# Patient Record
Sex: FEMALE | URBAN - METROPOLITAN AREA
[De-identification: names, ages, dates, MRNs, and addresses within clinical notes are randomized per-mention and may not be internally consistent; named-entity substitution may affect disease eponyms.]

---

## 2021-09-29 LAB
AVERAGE GLUCOSE: NORMAL
AVERAGE GLUCOSE: NORMAL
CHOLESTEROL, TOTAL: 294 MG/DL
CHOLESTEROL/HDL RATIO: ABNORMAL
HBA1C MFR BLD: 8.7 %
HBA1C MFR BLD: 8.7 %
HDLC SERPL-MCNC: 35 MG/DL (ref 35–70)
LDL CHOLESTEROL CALCULATED: 184 MG/DL (ref 0–160)
NONHDLC SERPL-MCNC: ABNORMAL MG/DL
TRIGL SERPL-MCNC: 642 MG/DL
VLDLC SERPL CALC-MCNC: ABNORMAL MG/DL

## 2021-10-27 LAB — MICROALBUMIN UR-MCNC: NEGATIVE

## 2021-11-23 ENCOUNTER — CLINICAL DOCUMENTATION (OUTPATIENT)
Dept: PHARMACY | Facility: CLINIC | Age: 41
End: 2021-11-23

## 2021-11-23 NOTE — LETTER
Karen 2  [address], [place] [address]  Phone: toll free [phone] Option #3        [address] [address]           11/23/21     Dear Donna Murdock! You have successfully enrolled in the Be Well With Diabetes program for 2021. What you receive  Beginning December 1, you will begin receiving up to $300 in waived copays for specific medications and pharmacy-related supplies purchased through our home delivery pharmacy, [facility]. A list of eligible medications and pharmacy supplies can be found at [employer] under Be Well With Diabetes. In addition, youll receive advice and help from our pharmacists, associate care management team and diabetes educators. (And, if you also participate in the Be Well program, you can earn points and Lifestyle Management or Health Management program credit, if applicable.)    What you need to do  To maintain your benefit this year and remain eligible next year, complete the following requirements:          *Can be satisfied through [facility] Screening on-site. **Requirements to enroll must be completed within 6 months of enrollment date **Pneumonia vaccine is dependent on previous immunization and your age. Remember, program requirements must be completed by deadlines shown. If not, your benefit may be terminated, and you will not be eligible to participate again until the following year. To keep you on track, well review your [employer] account and send reminders for action. (If you dont have a [address], submit documentation to [email or web address] or by fax to [phone].)    Congratulations and thank you for taking steps to improve your health and to Be Well With Diabetes. [address] [place] [address]  Phone: [phone] Option #3  Email: [email or web address]  Fax: [phone]

## 2021-11-23 NOTE — PROGRESS NOTES
Pharmacy Pop Care Documentation:   Patient has completed all the requirements by 11/25/21 and therefore will be enrolled in the DM Program on 12/01/21. Application received: 35/41/23  Application scanned. Letter and e-mail sent to patient.     Gallito Hammer, [facility]   Department, toll free: [phone] Option #3

## 2021-11-23 NOTE — PROGRESS NOTES
Pharmacy Pop Care Documentation:     AVS received for required office visit on: 10/05/21: Katherine Zhu CNP     **DX per DM OV AVS: DM Type Two**

## 2021-12-01 ENCOUNTER — CLINICAL DOCUMENTATION (OUTPATIENT)
Dept: PHARMACY | Facility: CLINIC | Age: 41
End: 2021-12-01

## 2021-12-01 ENCOUNTER — ENROLLMENT (OUTPATIENT)
Dept: PHARMACY | Facility: CLINIC | Age: 41
End: 2021-12-01

## 2021-12-03 ENCOUNTER — TELEPHONE (OUTPATIENT)
Dept: PHARMACY | Facility: CLINIC | Age: 41
End: 2021-12-03

## 2021-12-03 NOTE — TELEPHONE ENCOUNTER
Called patient to schedule 2021 yearly pharmacist appointment to discuss medications for Diabetes Management Program.    No answer. Left VM. Please call back at [phone], option #3     No Mychart. Letter mailed to patient.     Arben Armendariz, [address]   Phone: [phone], option #3

## 2021-12-03 NOTE — LETTER
[address] [facility] 12  [address], [place] [address]        Denton FloresPili [place] [address] [address]         12/03/21   Dear Denton Draper,    Congratulations! You have completed the requirements to be enrolled into the [address] [place] [address] Be Well with Diabetes Program.    Now that you are enrolled, another yearly requirement is to complete a Clinical Pharmacist Telephone appointment. The [address] [place] [address] Team has attempted to contact you to schedule your 2021 Diabetes Management telephone appointment but was unable to reach you. We would like to work with you and your doctor to:  - Review your medications, including over-the-counter and herbal medications  - Answer questions about your medications and how to get the most benefit from them  - Identify potential drug interactions or side effects and help fix them  - Identify preferred medications that are equally effective, but available at a lower cost to you  - Help you reach the necessary requirements to remain enrolled in the Be Well with Diabetes Program offered by [address] [place] [address]     Please call [phone], option #3 to schedule this appointment to take advantage of this service. Telephone appointments are available Monday thru Friday from 7:30 AM till 5:30 PM.     This is a courtesy reminder. If you have this appointment already scheduled for your 2021 enrollment in the program, please disregard this message. If you have not scheduled this appointment yet, please contact us at the above number to schedule. Sincerely,   [address] [place] [address]  Phone: toll free [phone], option 3  Email: [email or web address]  Fax: [phone]

## 2021-12-06 NOTE — TELEPHONE ENCOUNTER
For [place] in place:  No   Recommendation Provided To: Patient/Caregiver: 1 via Telephone   Gap Closed?: No    Intervention Accepted By: Patient/Caregiver: 0   Time Spent (min): 15

## 2022-01-01 LAB
AVERAGE GLUCOSE: NORMAL
HBA1C MFR BLD: 11.4 %

## 2022-01-13 ENCOUNTER — TELEPHONE (OUTPATIENT)
Dept: PHARMACY | Facility: CLINIC | Age: 42
End: 2022-01-13

## 2022-01-13 NOTE — TELEPHONE ENCOUNTER
For [place] in place:  No   Recommendation Provided To: Patient/Caregiver: 1 via Telephone   Intervention Detail: Scheduled Appointment   Gap Closed?: No    Intervention Accepted By:   [facility] Time Spent (min): 10

## 2022-01-13 NOTE — TELEPHONE ENCOUNTER
According to our records, we have not received the following requirements that were due by December 31st, 2021: [facility] AT [place] clinical pharmacist telephone appointment (call [phone] and select option #3 to schedule this appointment, Monday thru Friday 7:30 AM - 5:30 PM). Left message on voicemail for patient to call us back at [phone] Option #3, regarding the above information. Diarize message not able to be sent.

## 2022-01-24 LAB
CREATININE, URINE: 19.4
CREATININE, URINE: 19.4
MICROALBUMIN/CREAT 24H UR: <4 MG/G{CREAT}
MICROALBUMIN/CREAT 24H UR: <4 MG/G{CREAT}
MICROALBUMIN/CREAT UR-RTO: NORMAL
MICROALBUMIN/CREAT UR-RTO: NORMAL

## 2022-02-17 ENCOUNTER — TELEPHONE (OUTPATIENT)
Dept: PHARMACY | Facility: CLINIC | Age: 42
End: 2022-02-17

## 2022-02-17 NOTE — TELEPHONE ENCOUNTER
2022 Annual Pharmacist Visit    Called patient to schedule 2022 yearly pharmacist appointment to discuss medications for Diabetes Management Program.    No answer. Left VM on 2/17/22: Please call back at [phone] Option #3.      Rizwana Velazquez, Via [employer] [facility]   Department, toll free: [phone] Option #3

## 2022-02-24 ENCOUNTER — TELEPHONE (OUTPATIENT)
Dept: PHARMACY | Facility: CLINIC | Age: 42
End: 2022-02-24

## 2022-02-24 NOTE — TELEPHONE ENCOUNTER
Second attempt made to contact patient to schedule 2022 yearly pharmacist appointment to discuss medications for Diabetes Management Program.    No answer. Left VM on 2/24/22: Please call back at [phone] Option #3. [employer] message sent to patient.     For [place] [facility] in place:  No   Recommendation Provided To: Patient/Caregiver: 1 via Telephone and [employer]   Gap Closed?: No    Intervention Accepted By: Patient/Caregiver: 0   Time Spent (min): [address], [address] [employer]   Department, toll free: [phone] Option #3

## 2022-02-24 NOTE — TELEPHONE ENCOUNTER
Patient called in to schedule pharmacist appointment for 2022 DM program.     Appt scheduled for 3/2/2022 @ [facility] in place:  No   Recommendation Provided To: Patient/Caregiver: 1 via Telephone   Intervention Detail: Scheduled Appointment   Gap Closed?: Yes    Intervention Accepted By: Patient/Caregiver: 1   Time Spent (min): 10

## 2022-03-02 ENCOUNTER — SCHEDULED TELEPHONE ENCOUNTER (OUTPATIENT)
Dept: PHARMACY | Facility: CLINIC | Age: 42
End: 2022-03-02

## 2022-03-02 RX ORDER — AMLODIPINE BESYLATE 10 MG/1
10 TABLET ORAL DAILY
COMMUNITY

## 2022-03-02 RX ORDER — PREDNISONE 20 MG/1
20 TABLET ORAL DAILY
COMMUNITY

## 2022-03-02 RX ORDER — BUPROPION HYDROCHLORIDE 150 MG/1
150 TABLET ORAL EVERY MORNING
COMMUNITY

## 2022-03-02 RX ORDER — ONDANSETRON 4 MG/1
4 TABLET, FILM COATED ORAL EVERY 8 HOURS PRN
COMMUNITY

## 2022-03-02 RX ORDER — PROMETHAZINE HYDROCHLORIDE 25 MG/1
25 TABLET ORAL EVERY 6 HOURS PRN
COMMUNITY

## 2022-03-02 RX ORDER — PIOGLITAZONEHYDROCHLORIDE 15 MG/1
15 TABLET ORAL DAILY
COMMUNITY

## 2022-03-02 RX ORDER — DICYCLOMINE HCL 20 MG
20 TABLET ORAL 2 TIMES DAILY
COMMUNITY

## 2022-03-02 RX ORDER — ASPIRIN 81 MG/1
81 TABLET ORAL DAILY
COMMUNITY

## 2022-03-02 RX ORDER — METOPROLOL SUCCINATE 25 MG/1
12.5 TABLET, EXTENDED RELEASE ORAL DAILY
COMMUNITY

## 2022-03-02 RX ORDER — TOCILIZUMAB 180 MG/ML
162 INJECTION, SOLUTION SUBCUTANEOUS
COMMUNITY

## 2022-03-02 RX ORDER — CHLORAL HYDRATE 500 MG
1000 CAPSULE ORAL DAILY
COMMUNITY

## 2022-03-02 RX ORDER — METFORMIN HYDROCHLORIDE 500 MG/1
1000 TABLET, EXTENDED RELEASE ORAL
COMMUNITY

## 2022-03-02 RX ORDER — AZATHIOPRINE 100 MG/1
100 TABLET ORAL DAILY
COMMUNITY

## 2022-03-02 RX ORDER — HYDROXYZINE HYDROCHLORIDE 25 MG/1
25 TABLET, FILM COATED ORAL 3 TIMES DAILY PRN
COMMUNITY

## 2022-03-02 RX ORDER — ATORVASTATIN CALCIUM 80 MG/1
80 TABLET, FILM COATED ORAL DAILY
COMMUNITY

## 2022-03-02 RX ORDER — INSULIN GLARGINE 100 [IU]/ML
28 INJECTION, SOLUTION SUBCUTANEOUS EVERY MORNING
COMMUNITY

## 2022-03-02 RX ORDER — SULFAMETHOXAZOLE AND TRIMETHOPRIM 800; 160 MG/1; MG/1
1 TABLET ORAL
COMMUNITY

## 2022-03-02 RX ORDER — DICLOFENAC SODIUM 75 MG/1
75 TABLET, DELAYED RELEASE ORAL DAILY
COMMUNITY

## 2022-03-02 RX ORDER — OMEPRAZOLE 40 MG/1
40 CAPSULE, DELAYED RELEASE ORAL DAILY
COMMUNITY

## 2022-03-02 RX ORDER — FLUCONAZOLE 100 MG/1
100 TABLET ORAL DAILY PRN
COMMUNITY

## 2022-03-02 RX ORDER — FLUOXETINE HYDROCHLORIDE 20 MG/1
20 CAPSULE ORAL DAILY
COMMUNITY

## 2022-03-02 NOTE — TELEPHONE ENCOUNTER
[facility] CLINICAL PHARMACY REVIEW - Be Well with Diabetes    Shanta Valle is a 46 y.o. female enrolled in the [facility] AT [place] Employee Diabetes Program. Patient provided Maldonado Pop with verbal consent to remain in the program for this year. Patient enrolled 12/1/21. Insurance through the following employer: [employer]    Medications:  No current outpatient medications on file. No current facility-administered medications for this visit. Current Pharmacy: [facility] and moving scripts over 1 by 1 when due for refills  Current testing supplies/frequency: Prodigy testing 3 times per day  Pen needles/syringes: discussed using leader brand, has script she can get transferred    Allergies:  Not on File   Vitals/Labs:  BP Readings from Last 3 Encounters:   No data found for BP     Lab Results   Component Value Date    LABMICR negative 10/27/2021     Lab Results   Component Value Date    LABA1C 8.7 09/29/2021     Lab Results   Component Value Date    CHOL 294 09/29/2021    TRIG 642 09/29/2021    HDL 35 09/29/2021    LDLCALC 184 (A) 09/29/2021     No results found for: ALT, AST  The ASCVD Risk score (Gisela Chapa., et al., 2013) failed to calculate for the following reasons: The 2013 ASCVD risk score is only valid for ages 36 to 78     No results found for: CREATININE  CrCl cannot be calculated (No successful lab value found. ). No results found for: LABGLOM    Immunizations: There is no immunization history on file for this patient. Social History:  Social History     Tobacco Use    Smoking status: Not on file    Smokeless tobacco: Not on file   Substance Use Topics    Alcohol use: Not on file     ASSESSMENT:  Initial Program Requirements (Y indicates has completed for the year, N indicates needs to be completed by 07/01/2022):   No - Provider Visit for DM (1st)- pcp is Karuna Sarabia CNP- will have to send in documentation  No - A1c (1st) Ongoing Program Requirements (Y indicates has completed for the year, N indicates needs to be completed by 12/31/2022): No - Provider Visit for DM (2nd)  No - ACC/diabetes educator visit (if A1c over 8%)  No - A1c (2nd)  No - Lipid panel  No - Urine microalbumin  No - Pneumococcal vaccination: [id number]  No - Influenza vaccination for Fall 2022- says her rheumatologist told her not to get, she did send exclusion to HR  No - Medication adherence over 70%  Yes - On statin - atorvastatin  No - On ACEi/ARB or contraindication(s) but on amlodipine and she has renal artery inflammation     Current medications eligible for copay waiver, up to $600, through [address]:  - aspirin, amlodipine, atorvastatin, lantus, metformin, pioglitazone  - Prodigy     Diabetes Care:   - Glycemic Goal: <7.0% and directed by provider. Is not at blood glucose goal but recent diagnosis.  Type 2 DM under inadequate control as evidenced by > 8.  -  Diagnosed Sept 2021, is also steroid induced and will be titrating down prednisone with hopes of being off prednisone all together   - Current symptoms/problems include blurry vision and polydipsia  - Home blood sugar records:  fasting range: 180-210 but was 200-300  - Any episodes of hypoglycemia? 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- this is why on xarelto  - Eye exam current (within one year): no  - Foot exam current (within one year): no  - Appropriateness of Insulin Therapy: managed by NP, on long acting  - Therapy Optimization: metformin IR was upsetting her stomach, XR has been better could consider increase in metformin, discussed combo with metformin and GLP1  - Medication compliance: compliant all of the time  - Diet compliance: compliant most of the time, trying to avoid carbs, trying to do 15 g per snack and 30-40 with  - Current exercise: has started 15-20 min per day, recumbent bike and walking    Other Considerations:  - Blood Pressure Goal: BP less than 140/90 mmHg due to history of DM: Unable to assess if at BP target. - Lipids: Patient has a 10-yr ASCVD risk of < 20% with DM and is therefore a candidate for moderate-intensity statin therapy based on updated guidelines. < 40 but TG and lipid panel uncontrolled and is on atorva 80 mg and fish oil  - Smoking status: current smoker but on wellbutrin to stop- down to 1/2 ppd    PLAN:  - Consideration(s) for provider:   · Has not had DM education and is a new diagnosis of DM.  Patient will ask her provider to order this  · Prednisone will be a long taper but hopefully off of them at some point  · Discussed breathe easy program  · Fasting still sound high but need to wait for next A1c  - DM program gaps identified:   · Initial requirements: Provider Visit for DM (1st) and A1c (1st)   · Ongoing requirements: Provider visit for DM (2nd), ACC/diabetes educator visit (if A1c over 8%), A1c (2nd), Lipid panel, Urine microalbumin, Pneumococcal vaccination: Antoinette Beltre Influenza vaccination for 0527-1762 and Medication adherence over 70%   - Education to patient: Discussed general issues about diabetes pathophysiology and management., Addressed diet and exercise and Reminded to get yearly retinal exam   - Follow up: PCP for identified gaps or as scheduled below  - Upcoming appointments:   Future Appointments   Date Time Provider Phong Jones   3/2/2022 11:00 AM SCHEDULE, [address] None       Joel Bah, PharmD, [address] Pharmacist  Department: [phone]    For Pharmacy Admin Tracking Only     Gap Closed?: Yes    Time Spent (min): 60

## 2022-03-20 LAB
AVERAGE GLUCOSE: NORMAL
HBA1C MFR BLD: 10.9 %

## 2022-03-22 ENCOUNTER — TELEPHONE (OUTPATIENT)
Dept: PHARMACY | Facility: CLINIC | Age: 42
End: 2022-03-22

## 2022-03-22 NOTE — TELEPHONE ENCOUNTER
[facility] CLINICAL PHARMACY REVIEW - BE WELL WITH DIABETES: HIGH A1C  =============================================  Meghan Cottrell is a 46 y.o. female enrolled in the [address] [place], [place] [address] Be Well with Diabetes program.    Identified care gap(s): A1c > 8%    DM Program Prescriptions:  Current Outpatient Medications   Medication Sig Dispense Refill    metFORMIN (GLUCOPHAGE-XR) 500 MG extended release tablet Take 1,000 mg by mouth daily (with breakfast)      pioglitazone (ACTOS) 15 MG tablet Take 15 mg by mouth daily      buPROPion (WELLBUTRIN XL) 150 MG extended release tablet Take 150 mg by mouth every morning      metoprolol succinate (TOPROL XL) 25 MG extended release tablet Take 12.5 mg by mouth daily      azaTHIOprine 100 MG TABS Take 100 mg by mouth daily       sulfamethoxazole-trimethoprim (BACTRIM DS;SEPTRA DS) 800-160 MG per tablet Take 1 tablet by mouth three times a week      predniSONE (DELTASONE) 20 MG tablet Take 20 mg by mouth daily      tocilizumab (ACTEMRA) 162 MG/0.9ML SOSY injection Inject 162 mg into the skin every 7 days      amLODIPine (NORVASC) 10 MG tablet Take 10 mg by mouth daily      aspirin 81 MG EC tablet Take 81 mg by mouth daily      atorvastatin (LIPITOR) 80 MG tablet Take 80 mg by mouth daily      diclofenac (VOLTAREN) 75 MG EC tablet Take 75 mg by mouth daily      dicyclomine (BENTYL) 20 MG tablet Take 20 mg by mouth in the morning and at bedtime      FLUoxetine (PROZAC) 20 MG capsule Take 20 mg by mouth daily      insulin glargine (LANTUS SOLOSTAR) 100 UNIT/ML injection pen Inject 28 Units into the skin every morning      omeprazole (PRILOSEC) 40 MG delayed release capsule Take 40 mg by mouth daily      rivaroxaban (XARELTO) 20 MG TABS tablet Take 20 mg by mouth daily (with breakfast)      ondansetron (ZOFRAN) 4 MG tablet Take 4 mg by mouth every 8 hours as needed for Nausea or Vomiting      promethazine (PHENERGAN) 25 MG tablet Take 25 mg by mouth every 6 hours as needed for Nausea      hydrOXYzine (ATARAX) 25 MG tablet Take 25 mg by mouth 3 times daily as needed for Anxiety      fluconazole (DIFLUCAN) 100 MG tablet Take 100 mg by mouth daily as needed (infection)      Omega-3 Fatty Acids (FISH OIL) 1000 MG CAPS Take 1,000 mg by mouth daily       No current facility-administered medications for this visit. Allergies: Allergies   Allergen Reactions    Zoloft [Sertraline] Swelling        Labs:  Lab Results   Component Value Date    LABA1C 8.7 09/29/2021     CrCl cannot be calculated (No successful lab value found. ). Care Team:   - Physician (PCP): Stefanie Mckoy CNP (Last OV: ?)  - ACC/RD: none  - Upcoming appointments:   No future appointments. Refill History:   Metformin 2/11/22  Pioglitazone 2/9/22  Prodigy 2/22/22    Diabetes Care:  - Glycemic Goal: <7.0% and directed by provider. Is not at blood glucose goal but is steroid induced. .   - Therapy Optimization:   - Medication changes since last A1c: tapering steroids? what dose of steroids is she on now? - Medication Adherence Assessment: just starting to fill at [facility]    Plan:    Attempt made to reach patient by telephone for diabetes review. Left voice message for patient to return clinician's phone call to dept. Will continue to attempt to contact patient by telephone as appropriate. Would like to review: see note     pcp is Stefanie Mckoy CNP- remind to send in documentation  When is new A1c? Did she get order placed for DM education? Still interested in breathe easy program  Prodigy filled for TID- how often testing sugars?  What is avg fasting     Nikko Oleary, PharmD, [address] Pharmacist  Department: [phone]    For Pharmacy Admin Tracking Only     Gap Closed?: No    Time Spent (min): 15

## 2022-04-21 ENCOUNTER — TELEPHONE (OUTPATIENT)
Dept: PHARMACY | Facility: CLINIC | Age: 42
End: 2022-04-21

## 2022-04-21 NOTE — TELEPHONE ENCOUNTER
POPULATION HEALTH CLINICAL PHARMACY REVIEW - BE WELL WITH DIABETES: HIGH A1C  =============================================  Dorota Charles is a 46 y.o. female enrolled in the [facility] AT [place] Be Well with Diabetes program.    Identified care gap(s): A1c > 8%    DM Program Prescriptions:  Current Outpatient Medications   Medication Sig Dispense Refill    metFORMIN (GLUCOPHAGE-XR) 500 MG extended release tablet Take 1,000 mg by mouth daily (with breakfast)      pioglitazone (ACTOS) 15 MG tablet Take 15 mg by mouth daily      buPROPion (WELLBUTRIN XL) 150 MG extended release tablet Take 150 mg by mouth every morning      metoprolol succinate (TOPROL XL) 25 MG extended release tablet Take 12.5 mg by mouth daily      azaTHIOprine 100 MG TABS Take 100 mg by mouth daily       sulfamethoxazole-trimethoprim (BACTRIM DS;SEPTRA DS) 800-160 MG per tablet Take 1 tablet by mouth three times a week      predniSONE (DELTASONE) 20 MG tablet Take 20 mg by mouth daily      tocilizumab (ACTEMRA) 162 MG/0.9ML SOSY injection Inject 162 mg into the skin every 7 days      amLODIPine (NORVASC) 10 MG tablet Take 10 mg by mouth daily      aspirin 81 MG EC tablet Take 81 mg by mouth daily      atorvastatin (LIPITOR) 80 MG tablet Take 80 mg by mouth daily      diclofenac (VOLTAREN) 75 MG EC tablet Take 75 mg by mouth daily      dicyclomine (BENTYL) 20 MG tablet Take 20 mg by mouth in the morning and at bedtime      FLUoxetine (PROZAC) 20 MG capsule Take 20 mg by mouth daily      insulin glargine (LANTUS SOLOSTAR) 100 UNIT/ML injection pen Inject 28 Units into the skin every morning      omeprazole (PRILOSEC) 40 MG delayed release capsule Take 40 mg by mouth daily      rivaroxaban (XARELTO) 20 MG TABS tablet Take 20 mg by mouth daily (with breakfast)      ondansetron (ZOFRAN) 4 MG tablet Take 4 mg by mouth every 8 hours as needed for Nausea or Vomiting      promethazine (PHENERGAN) 25 MG tablet Take 25 mg by mouth every 6 hours as needed for Nausea      hydrOXYzine (ATARAX) 25 MG tablet Take 25 mg by mouth 3 times daily as needed for Anxiety      fluconazole (DIFLUCAN) 100 MG tablet Take 100 mg by mouth daily as needed (infection)      Omega-3 Fatty Acids (FISH OIL) 1000 MG CAPS Take 1,000 mg by mouth daily       No current facility-administered medications for this visit. Allergies: Allergies   Allergen Reactions    Zoloft [Sertraline] Swelling        Labs:  Lab Results   Component Value Date    LABA1C 8.7 09/29/2021     CrCl cannot be calculated (No successful lab value found. ). Care Team:   - Physician (PCP): Nevaeh Bray CNP (Last OV: ?)    - Upcoming appointments:   No future appointments. Refill History:   Metformin 500 mg BID 2/11/22 for 90 ds  Pioglitazone 15 mg 2/9/22 90 ds  Lantus 10 units daily  Prodigy 2/22/22  Atorvastatin 80 mg 3/8/22    Diabetes Care:  - Glycemic Goal: <7.0% and directed by provider. Is not at blood glucose goal ...   - Appropriateness of Insulin Therapy: lantus just started  - Therapy Optimization: ozempic and lantus titration  - Medication changes since last A1c: pcp notes not able to be seen, however script for ozempic was filled on 3/24/22 and lantus on 3/28/22  - Medication Adherence Assessment: only one fills    Plan:    Attempt made to reach patient by telephone for diabetes review. Left voice message for patient to return clinician's phone call to dept. Will continue to attempt to contact patient by telephone as appropriate. Would like to review: see note     Patient on actemra and prednisone per rheumatology     pcp is Nevaeh Bray CNP- remind to send in documentation  When is new A1c? Did she get order placed for DM education? Still interested in breathe easy program?- per rheum note patient is on nictotine patch and wellbutrin and trying to quit- but I do not see these fills at [address] [facility] filled for TID- how often testing sugars?  What is avg fasting Millie Zayas, PharmD, [address] Pharmacist  Department: [phone]    For Pharmacy Admin Tracking Only     Gap Closed?: No    Time Spent (min): 15

## 2022-06-07 ENCOUNTER — PATIENT MESSAGE (OUTPATIENT)
Dept: PHARMACY | Facility: CLINIC | Age: 42
End: 2022-06-07

## 2022-06-07 ENCOUNTER — TELEPHONE (OUTPATIENT)
Dept: PHARMACY | Facility: CLINIC | Age: 42
End: 2022-06-07

## 2022-06-07 NOTE — TELEPHONE ENCOUNTER
[address] [facility] Employee Diabetes Program    Светлана Lauren is a 46 y.o. female enrolled in the [facility] Diabetes Program. The goal of this voluntary program is to help employees and covered dependents reach their health maintenance goals in regards to their diabetes diagnosis. According to our records, patient is missing the following requirement(s) that must be completed by July 1, 2022 to avoid discharge from the program:     First diabetes visit with your physician in 2022   First A1c result in 2022      Plan:  Attempt made to reach patient by telephone to review above. Left voice message for patient to return clinician's phone call to [phone], option 3. [employer] message sent to patient.     Sridevi Bradford, [address] [employer] [facility]   Department, toll free: [phone] Option #3

## 2022-06-07 NOTE — LETTER
Karen 2  [address] [facility], [address]  Phone: toll free [phone], option 3        Chica Brock [facility] [address]           06/16/22     Dear Tiffani Neff are currently enrolled in the Be Well with Diabetes Program. Our records indicate that we are missing the requirements listed below. If these requirements are not completed by July 1, 2022, then you may be disenrolled from the program:     - First diabetes visit with your physician in 2022   - First A1c result in 2022     You must submit documentation of completion of requirements if your provider does not use the [facility] electronic charting system or if completed outside of the system. Return the documentation to [email or web address] or by fax at [phone]. Please call our office so we can discuss the missing requirements with you to ensure that you will remain enrolled in the 2022 Be Well with Diabetes Program.     Thank you,     Karen 2 Team   Phone: toll free [phone], Option #3   Email: [email or web address]   Fax Number: [phone]

## 2022-06-22 LAB
AVERAGE GLUCOSE: NORMAL
CHOLESTEROL, TOTAL: 188 MG/DL
HBA1C MFR BLD: 5.9 %
TRIGL SERPL-MCNC: 311 MG/DL

## 2022-06-24 ENCOUNTER — CLINICAL DOCUMENTATION (OUTPATIENT)
Dept: PHARMACY | Facility: CLINIC | Age: 42
End: 2022-06-24

## 2022-06-24 NOTE — PROGRESS NOTES
Pharmacy Pop Care Documentation:     AVS received for required office visit on: 6/23/22: Iain Islas CNP

## 2022-09-21 LAB
CHOLESTEROL, TOTAL: 181 MG/DL
CHOLESTEROL/HDL RATIO: ABNORMAL
HDLC SERPL-MCNC: 28 MG/DL (ref 35–70)
LDL CHOLESTEROL CALCULATED: 80 MG/DL (ref 0–160)
NONHDLC SERPL-MCNC: ABNORMAL MG/DL
TRIGL SERPL-MCNC: 365 MG/DL
VLDLC SERPL CALC-MCNC: ABNORMAL MG/DL

## 2022-11-21 ENCOUNTER — CLINICAL DOCUMENTATION (OUTPATIENT)
Dept: PHARMACY | Facility: CLINIC | Age: 42
End: 2022-11-21

## 2022-11-21 NOTE — PROGRESS NOTES
Pharmacy Pop Care Documentation:     AVS received for required office visit on:  9/21/22 with Evans Florentino CNP- Careeverywhere    Lipid panel also completed and added from 9/21    W.  Nicki Bertrand, PharmD, [address] [facility], toll free: [phone]    For Pharmacy Admin Tracking Only  Time Spent (min): 5

## 2022-12-18 LAB
AVERAGE GLUCOSE: NORMAL
HBA1C MFR BLD: 6.4 %

## 2022-12-30 ENCOUNTER — CLINICAL DOCUMENTATION (OUTPATIENT)
Dept: PHARMACY | Facility: CLINIC | Age: 42
End: 2022-12-30

## 2023-01-04 ENCOUNTER — TELEPHONE (OUTPATIENT)
Dept: PHARMACY | Age: 43
End: 2023-01-04

## 2023-01-04 NOTE — TELEPHONE ENCOUNTER
Specialty Medication Service    Date: 1/4/2023  Patient's Name: Joseph Kerr YOB: 1980            _____________________________________________________________________________________________    Left message to schedule Medical Director  appointment for Specialty Medication Services. Please call: [phone] option 4. Will continue to outreach as appropriate.     Suma Ramos PharmD  Ambulatory Clinical Pharmacist   Specialty Medication Services   Phone: [phone] option 4  1/4/2023 11:27 AM

## 2023-01-11 NOTE — TELEPHONE ENCOUNTER
Specialty Medication Service    Date: 1/11/2023  Patient's Name: Yemi Mensah YOB: 1980            _____________________________________________________________________________________________    Left message to schedule Medical Director  appointment for Specialty Medication Services. Please call: [phone] option 4. Will continue to outreach as appropriate.     Bryan De Souza PharmD  Ambulatory Clinical Pharmacist   Specialty Medication Services   Phone: [phone] option 4  1/11/2023 10:58 AM

## 2023-01-27 NOTE — TELEPHONE ENCOUNTER
Specialty Medication Service    Date: 1/27/2023  Patient's Name: Shelley Iqbal YOB: 1980            _____________________________________________________________________________________________    Patient is not eligible for SMS program due to [employer] benefits. No further outreach planned at this time.     Jt Davenport PharmD, [facility]  Ambulatory Clinical Pharmacist   [address] [facility] Specialty Medication Service  Phone: [phone]  [facility]  Phone: [phone] option 1    For Pharmacy Admin Tracking Only    Program: SMS  CPA in place:  No  Time Spent (min): 20

## 2023-02-10 ENCOUNTER — TELEPHONE (OUTPATIENT)
Dept: PHARMACY | Facility: CLINIC | Age: 43
End: 2023-02-10

## 2023-02-10 NOTE — TELEPHONE ENCOUNTER
2023 Annual Pharmacist Visit  **Patient is [employer]**    Called patient to schedule 2023 yearly pharmacist appointment to discuss medications for Diabetes Management Program.    No answer. Left VM on TAD: Please call back at [phone] Option #3.      [address] [facility]   Department, toll free: [phone] Option #3

## 2023-02-24 NOTE — TELEPHONE ENCOUNTER
Second attempt made to contact patient to schedule 2023 yearly pharmacist appointment to discuss medications for Diabetes Management Program.    No answer. Left VM on TAD: Please call back at [phone] Option #3. Formabilio message sent to patient.     [address] [facility]   Department, toll free: [phone] Option #3        For Pharmacy Admin Tracking Only    Program: [address] in place:  No  Gap Closed?: No   Time Spent (min): 15

## 2023-03-07 ENCOUNTER — TELEPHONE (OUTPATIENT)
Dept: PHARMACY | Facility: CLINIC | Age: 43
End: 2023-03-07

## 2023-03-07 NOTE — TELEPHONE ENCOUNTER
**Patient is [employer]**     2023 Annual Pharmacist Visit    Called patient to schedule 2023 yearly pharmacist appointment to discuss medications for Diabetes Management Program.    No answer. Left VM.      Please call back at [phone], option #3         Isai Anderson, [address]   Phone: [phone], option #3

## 2023-03-14 NOTE — TELEPHONE ENCOUNTER
Second attempt made to contact patient to schedule 2023 yearly pharmacist appointment to discuss medications for Diabetes Management Program.    No answer. Left VM.     Please call back at [phone], option #3.     Gamart message sent.        Estephania Dela Cruz [facility]  Clinical Pharmacy   Phone: [phone], option #3     For Pharmacy Admin Tracking Only    Program: [employer]  CPA in place:  No  Gap Closed?: No   Time Spent (min): 10

## 2023-05-02 ENCOUNTER — TELEPHONE (OUTPATIENT)
Dept: PHARMACY | Facility: CLINIC | Age: 43
End: 2023-05-02

## 2023-05-15 NOTE — TELEPHONE ENCOUNTER
Second attempt made to contact patient to schedule 2023 yearly pharmacist appointment to discuss medications for Diabetes Management Program.    No answer. VM full. Please call back at [phone], option #3. [employer] message sent.         Honey Koyanagi, [address]   Phone: [phone], option #3       For Pharmacy Admin Tracking Only    Program: [address] in place:  No  Gap Closed?: Yes   Time Spent (min): 10

## 2023-05-16 LAB
AVERAGE GLUCOSE: NORMAL
CHOLESTEROL, TOTAL: 193 MG/DL
CHOLESTEROL/HDL RATIO: ABNORMAL
CREATININE, URINE: 105.6
HBA1C MFR BLD: 7.3 %
HDLC SERPL-MCNC: 31 MG/DL (ref 35–70)
LDL CHOLESTEROL CALCULATED: 90 MG/DL (ref 0–160)
MICROALBUMIN/CREAT 24H UR: NORMAL MG/G{CREAT}
MICROALBUMIN/CREAT UR-RTO: <20
NONHDLC SERPL-MCNC: ABNORMAL MG/DL
PROTEIN TOTAL, URINE: <3
TRIGL SERPL-MCNC: 359 MG/DL
VLDLC SERPL CALC-MCNC: ABNORMAL MG/DL

## 2023-05-31 ENCOUNTER — TELEPHONE (OUTPATIENT)
Dept: PHARMACY | Facility: CLINIC | Age: 43
End: 2023-05-31

## 2023-05-31 ENCOUNTER — CLINICAL DOCUMENTATION (OUTPATIENT)
Dept: PHARMACY | Facility: CLINIC | Age: 43
End: 2023-05-31

## 2023-05-31 RX ORDER — ESTRADIOL 1 MG/1
1 TABLET ORAL DAILY
COMMUNITY

## 2023-05-31 RX ORDER — MEDROXYPROGESTERONE ACETATE 2.5 MG/1
5 TABLET ORAL DAILY
COMMUNITY

## 2023-05-31 RX ORDER — ROSUVASTATIN CALCIUM 20 MG/1
20 TABLET, COATED ORAL DAILY
COMMUNITY

## 2023-05-31 RX ORDER — SEMAGLUTIDE 2.68 MG/ML
2 INJECTION, SOLUTION SUBCUTANEOUS WEEKLY
COMMUNITY

## 2023-05-31 RX ORDER — CLOPIDOGREL BISULFATE 75 MG/1
75 TABLET ORAL DAILY
COMMUNITY

## 2023-05-31 RX ORDER — CARVEDILOL 3.12 MG/1
3.12 TABLET ORAL 2 TIMES DAILY WITH MEALS
COMMUNITY

## 2023-05-31 RX ORDER — PANTOPRAZOLE SODIUM 40 MG/1
40 TABLET, DELAYED RELEASE ORAL DAILY
COMMUNITY

## 2023-05-31 NOTE — TELEPHONE ENCOUNTER
[facility] HEALTH CLINICAL PHARMACY REVIEW - Be Well with Diabetes    Petrona Lauren is a 38 y.o. female enrolled in the [address] [place] [address],[address] Employee Diabetes Program. Patient provided Verlie Sleek with verbal consent to remain in the program for this year. Patient enrolled 2021.     Insurance through the following employer: [employer]    Medications:  Current Outpatient Medications   Medication Instructions    Actemra 162 mg, SubCUTAneous, EVERY 7 DAYS    amLODIPine (NORVASC) 10 mg, Oral, DAILY    aspirin 81 mg, Oral, DAILY  Also now on plavix 75 mg daily    atorvastatin (LIPITOR) 80 mg, Oral, DAILY  - rosuvastatin 20 mg daily    azaTHIOprine 100 mg, Oral, DAILY  - 150 mg daily    buPROPion (WELLBUTRIN XL) 150 mg, Oral, EVERY MORNING  - no longer taking    diclofenac (VOLTAREN) 75 mg, Oral, DAILY    dicyclomine (BENTYL) 20 mg, Oral, 2 times daily    fish oil 1,000 mg, Oral, DAILY    fluconazole (DIFLUCAN) 100 mg, Oral, DAILY PRN    FLUoxetine (PROZAC) 20 mg, Oral, DAILY  - 40 mg daily    hydrOXYzine HCl (ATARAX) 25 mg, Oral, 3 TIMES DAILY PRN    Lantus SoloStar 28 Units, SubCUTAneous, EVERY MORNING  - 36 units am    metFORMIN (GLUCOPHAGE-XR) 1,000 mg, Oral, DAILY WITH BREAKFAST    metoprolol succinate (TOPROL XL) 12.5 mg, Oral, DAILY  - coreg    omeprazole (PRILOSEC) 40 mg, Oral, DAILY  - changed to protonix bc now on plavix    ondansetron (ZOFRAN) 4 mg, Oral, EVERY 8 HOURS PRN    pioglitazone (ACTOS) 15 mg, Oral, DAILY    predniSONE (DELTASONE) 20 mg, Oral, DAILY  - steroids have been stopped    promethazine (PHENERGAN) 25 mg, Oral, EVERY 6 HOURS PRN    rivaroxaban (XARELTO) 20 mg, Oral, DAILY WITH BREAKFAST  - stopped by vascular     sulfamethoxazole-trimethoprim (BACTRIM DS;SEPTRA DS) 800-160 MG per tablet 1 tablet, Oral, THREE TIMES WEEKLY  - no longer on chemo, so no longer on this     Current Pharmacy: [facility]  Current testing supplies/frequency: Prodigy and Dexcom

## 2023-05-31 NOTE — PROGRESS NOTES
Pharmacy Pop Care Documentation:     AVS received for required office visit on:  1st 2023 DM OV: 5/17/23 Dr. Kamar Cha per care everywhere      Hemoglobin A1c percentage  on 05-   HbA1c (Bld) [Mass fraction] 7.3 %      Urine albumin/creatinine mass ratio for detection of microalbuminuria  on 05-   Albumin/Creatinine DL <= 20 mg/L (U)          Urine creatinine measurement (mass/volume)  on 05-   Creatinine (U) [Mass/Vol] 105.60 mg/dL      Urine protein measurement (mass/volume)  on 05-   Protein (U) [Mass/Vol] <3.0 mg/L      Serum or plasma triglyceride measurement (mass/volume)  on 05-   Triglyceride [Mass/Vol] 359 mg/dL      Serum or plasma high density lipoprotein (HDL) cholesterol measurement  on 05-   Cholesterol in HDL [Mass/Vol] 31 mg/dL       Serum or plasma cholesterol measurement (mass/volume)  on 05-   Cholesterol [Mass/Vol] 193 mg/dL       Cholesterol in LDL Calc [Mass/Vol]  on 05-   Cholesterol in LDL [Mass/Vol] 90 mg/dL

## 2023-08-12 LAB
AVERAGE GLUCOSE: NORMAL
HBA1C MFR BLD: 8.3 %

## 2023-08-28 ENCOUNTER — CLINICAL DOCUMENTATION (OUTPATIENT)
Dept: PHARMACY | Facility: CLINIC | Age: 43
End: 2023-08-28

## 2023-08-28 NOTE — PROGRESS NOTES
Pharmacy Pop Care Documentation:     AVS received for required office visit on:  8/12/23 with Aaliyah Collado per careeverywhere    A1c updated    W.  Shanel Perez, PharmD,  [facility], toll free: [phone]    For Pharmacy Admin Tracking Only    Program: [address] [place] [address]  Time Spent (min): 5

## 2023-10-02 ENCOUNTER — TELEPHONE (OUTPATIENT)
Dept: PHARMACY | Facility: CLINIC | Age: 43
End: 2023-10-02

## 2023-10-02 NOTE — TELEPHONE ENCOUNTER
POPULATION HEALTH CLINICAL PHARMACY REVIEW - BE WELL WITH DIABETES: HIGH A1C  =============================================  Anatoliy Hart is a 38 y.o. female enrolled in the [facility] Be Well with Diabetes program.    Identified care gap(s): A1c > 8% (only requirement left in 2023)    DM Program Prescriptions:  Current Outpatient Medications   Medication Instructions    Actemra 162 mg, SubCUTAneous, EVERY 7 DAYS    amLODIPine (NORVASC) 10 mg, Oral, DAILY    aspirin 81 mg, Oral, DAILY    azaTHIOprine (IMURAN) 150 mg, Oral, DAILY    carvedilol (COREG) 3.125 mg, Oral, 2 TIMES DAILY WITH MEALS    clopidogrel (PLAVIX) 75 mg, Oral, DAILY    diclofenac (VOLTAREN) 75 mg, Oral, DAILY    Eluxadoline 100 mg, Oral, 2 times daily    estradiol (ESTRACE) 1 mg, Oral, DAILY    fish oil 1,000 mg, Oral, DAILY    FLUoxetine (PROZAC) 40 mg, Oral, DAILY    Lantus SoloStar 36 Units, SubCUTAneous, EVERY MORNING    medroxyPROGESTERone (PROVERA) 5 mg, Oral, DAILY    metFORMIN (GLUCOPHAGE-XR) 1,000 mg, Oral, DAILY WITH BREAKFAST    ondansetron (ZOFRAN) 4 mg, Oral, EVERY 8 HOURS PRN    Ozempic (2 MG/DOSE) 2 mg, SubCUTAneous, WEEKLY    pantoprazole (PROTONIX) 40 mg, Oral, DAILY    pioglitazone (ACTOS) 15 mg, Oral, DAILY    Probiotic Product (PROBIOTIC PO) 1 capsule, Oral, DAILY    rosuvastatin (CRESTOR) 20 mg, Oral, DAILY        Allergies: Allergies   Allergen Reactions    Zoloft [Sertraline] Swelling        Labs:  Lab Results   Component Value Date    LABA1C 8.3 08/12/2023    LABA1C 7.3 05/16/2023    LABA1C 6.4 12/18/2022     CrCl cannot be calculated (No successful lab value found. ). Care Team:   Physician (PCP): Aaliyah Collado (Last OV: 8/12/23- found in [address])    - Upcoming appointments:   No future appointments. Diabetes Care:  - Glycemic Goal: <7.0%. Is no longer at blood glucose goal and is most likely due to stopping Ozempic earlier this year.   She reports that she could not tolerate the GI side effects

## 2023-11-02 ENCOUNTER — TELEPHONE (OUTPATIENT)
Dept: PHARMACY | Facility: CLINIC | Age: 43
End: 2023-11-02

## 2023-11-02 NOTE — TELEPHONE ENCOUNTER
months ago. She has continued her other meds (metformin, Pioglitazone, Lantus) and is filling regularly. - Unclear in her provider note if she is now using anything different. Victoza was mentioned, but does not look to have been sent to [facility] or filled anywhere  - Pt had also been using Dexcom G6. Will make aware of likely cost difference next year, and that she can change to the [address] if interested     Plan:  Attempted to reach pt, LVM asking for return call to [phone] option 3     Would like to discuss:  - A1c was up in August.  Did she have any changes at her last OV?  - Did her provider prescribe Victoza? - Make aware that Dexcom price should likely improve next year. Could also consider SHIRIN Bueno, PharmD,  [facility]  Department, toll free: [phone]    For Pharmacy Admin Tracking Only    Program: [address] [place] [address]  Time Spent (min): 15

## 2024-01-26 ENCOUNTER — TELEPHONE (OUTPATIENT)
Dept: PHARMACY | Facility: CLINIC | Age: 44
End: 2024-01-26

## 2024-02-21 ENCOUNTER — TELEPHONE (OUTPATIENT)
Dept: PHARMACY | Facility: CLINIC | Age: 44
End: 2024-02-21

## 2024-02-21 RX ORDER — DULAGLUTIDE 0.75 MG/.5ML
0.75 INJECTION, SOLUTION SUBCUTANEOUS WEEKLY
COMMUNITY

## 2024-02-21 RX ORDER — CHOLECALCIFEROL (VITAMIN D3) 1250 MCG
50000 CAPSULE ORAL WEEKLY
COMMUNITY

## 2024-02-21 RX ORDER — LANCETS 28 GAUGE
EACH MISCELLANEOUS
COMMUNITY

## 2024-02-21 NOTE — TELEPHONE ENCOUNTER
Medication adherence over 70% - monitor adherence; tries to take DM medications in morning  Yes - On statin or contraindication(s)  rosuvastatin on med list - overdue to be refilled?  She believes she still has some at home but will double check; confirms taking daily  Override  - On ACEi/ARB or contraindication(s) Normal blood pressure, urinary albumin-to-creatinine ratio, and eGFR     Formulary Medication Review:  Non-formulary or medications with cost-effective alternatives: approved for Dexcom G7 per [place]; she will think about this    Current medications eligible for copay waiver, up to $600, through [facility]:  - amlodipine, aspirin (with prescription), Lantus, metformin, pioglitazone, rosuvastatin, Trulicity  - Prodigy blood sugar testing supplies, insulin pen needles  - Dexcom G7 if desired & prescribed by provider     Diabetes Care:   - Glycemic Goal: <7.0% and directed by provider. Is not at blood glucose goal. Type 2 DM on current prescribed regimen metformin ER 1000 mg (500 mg x 2) daily, pioglitazone 15 mg daily, Trulicity 0.75 mg once weekly, Lantus 36 units daily  - Home blood sugar records:  Checking AMFBG, sometimes another time throughout day; usually 140-160 (previously had been 160s or above so is noticing going down)  - Any episodes of hypoglycemia? no  - Eye exam current (within one year): unknown - she will follow up  - Foot exam current (within one year): yes  - Medication changes since last A1c: started Trulicity (had to stop Ozempic due to GI ADRs)  - Medications/Classes already tried/failed: Ozempic (GI side effects)  - Therapy Optimization: Trulicity can be titrated up to 4.5 mg once weekly as needed/tolerated  - Medication compliance: compliant all of the time    Other Considerations:  - Blood Pressure Goal: BP less than 130/80 mmHg due to history of DM: last /85 mmHg on 10/24/23 (per CareEverywhere) - close to goal  - Lipids: Patient prescribed a

## 2024-03-22 ENCOUNTER — PATIENT MESSAGE (OUTPATIENT)
Dept: PHARMACY | Facility: CLINIC | Age: 44
End: 2024-03-22

## 2024-03-22 ENCOUNTER — CLINICAL DOCUMENTATION (OUTPATIENT)
Dept: PHARMACY | Facility: CLINIC | Age: 44
End: 2024-03-22

## 2024-03-22 NOTE — PROGRESS NOTES
1st Quarterly Reminder sent to patient for the DM Program - See Mychart message or Letter for more information.    Krystle Armstrong CphT  [facility]  Clinical Pharmacy   Department, toll free: [phone] Option #3      For Pharmacy Admin Tracking Only    Program: [employer]  CPA in place:  No  Gap Closed?: Yes   Time Spent (min): 5

## 2024-03-31 LAB
CHOLESTEROL, TOTAL: 188 MG/DL
CHOLESTEROL/HDL RATIO: ABNORMAL
ESTIMATED AVERAGE GLUCOSE: NORMAL
HBA1C MFR BLD: 8.5 %
HDLC SERPL-MCNC: 32 MG/DL (ref 35–70)
LDL CHOLESTEROL CALCULATED: 97 MG/DL (ref 0–160)
NONHDLC SERPL-MCNC: ABNORMAL MG/DL
TRIGL SERPL-MCNC: 485 MG/DL
VLDLC SERPL CALC-MCNC: ABNORMAL MG/DL

## 2024-04-03 ENCOUNTER — TELEPHONE (OUTPATIENT)
Dept: PHARMACY | Facility: CLINIC | Age: 44
End: 2024-04-03

## 2024-04-03 NOTE — TELEPHONE ENCOUNTER
[facility] Employee Diabetes Program - Live Well With Diabetes  =================================================================  El Santa is a 43 y.o. female enrolled in the [facility] Be Well with Diabetes program.    Identified care gap(s): A1c > 8%    DM Program Prescriptions:  Current Outpatient Medications   Medication Instructions    Actemra 162 mg, SubCUTAneous, EVERY 7 DAYS    amLODIPine (NORVASC) 10 mg, Oral, DAILY    aspirin 81 mg, Oral, DAILY    azaTHIOprine (IMURAN) 50 MG tablet Take by mouth 2 tablets (100 mg) in the morning and 1 tablet (50 mg) in the evening    carvedilol (COREG) 3.125 mg, Oral, 2 TIMES DAILY WITH MEALS    clopidogrel (PLAVIX) 75 mg, Oral, DAILY    diclofenac (VOLTAREN) 75 mg, Oral, DAILY    Eluxadoline 100 mg, Oral, 2 times daily    estradiol (ESTRACE) 1 mg, Oral, DAILY    fish oil 1,000 mg, Oral, DAILY    FLUoxetine (PROZAC) 40 mg, Oral, DAILY    Glucose Blood (PRODIGY BLOOD GLUCOSE TEST VI) Use to test blood sugar up to 3 times per day    Insulin Pen Needle 31G X 8 MM MISC Use every day with insulin as directed    Lantus SoloStar 36 Units, SubCUTAneous, EVERY MORNING    medroxyPROGESTERone (PROVERA) 5 mg, Oral, DAILY    metFORMIN (GLUCOPHAGE-XR) 1,000 mg, Oral, DAILY WITH BREAKFAST    ondansetron (ZOFRAN) 4 mg, Oral, EVERY 8 HOURS PRN    pantoprazole (PROTONIX) 40 mg, Oral, DAILY    pioglitazone (ACTOS) 15 mg, Oral, DAILY    Probiotic Product (PROBIOTIC PO) 1 capsule, Oral, DAILY    Prodigy Twist Top Lancets 28G MISC Use to test blood sugar up to 3 times per day    rosuvastatin (CRESTOR) 20 mg, Oral, DAILY    Trulicity 0.75 mg, SubCUTAneous, WEEKLY    Vitamin D3 50,000 Units, Oral, WEEKLY        Allergies:  Allergies   Allergen Reactions    Zoloft [Sertraline] Swelling        Labs:    Lab Results   Component Value Date/Time    LABA1C 8.3 08/12/2023 12:00 AM    LABA1C 7.3 05/16/2023 12:00 AM    LABA1C 6.4 12/18/2022 12:00 AM    LDLCALC 90

## 2024-05-07 ENCOUNTER — TELEPHONE (OUTPATIENT)
Dept: PHARMACY | Facility: CLINIC | Age: 44
End: 2024-05-07

## 2024-05-07 NOTE — TELEPHONE ENCOUNTER
[facility] Employee Diabetes Program - Live Well With Diabetes  =================================================================  El Santa is a 43 y.o. female enrolled in the [facility] Be Well with Diabetes program.     Identified care gap(s): A1c > 8%     DM Program Prescriptions:       Current Outpatient Medications   Medication Instructions    Actemra 162 mg, SubCUTAneous, EVERY 7 DAYS    amLODIPine (NORVASC) 10 mg, Oral, DAILY    aspirin 81 mg, Oral, DAILY    azaTHIOprine (IMURAN) 50 MG tablet Take by mouth 2 tablets (100 mg) in the morning and 1 tablet (50 mg) in the evening    carvedilol (COREG) 3.125 mg, Oral, 2 TIMES DAILY WITH MEALS    clopidogrel (PLAVIX) 75 mg, Oral, DAILY    diclofenac (VOLTAREN) 75 mg, Oral, DAILY    Eluxadoline 100 mg, Oral, 2 times daily    estradiol (ESTRACE) 1 mg, Oral, DAILY    fish oil 1,000 mg, Oral, DAILY    FLUoxetine (PROZAC) 40 mg, Oral, DAILY    Glucose Blood (PRODIGY BLOOD GLUCOSE TEST VI) Use to test blood sugar up to 3 times per day    Insulin Pen Needle 31G X 8 MM MISC Use every day with insulin as directed    Lantus SoloStar 36 Units, SubCUTAneous, EVERY MORNING    medroxyPROGESTERone (PROVERA) 5 mg, Oral, DAILY    metFORMIN (GLUCOPHAGE-XR) 1,000 mg, Oral, DAILY WITH BREAKFAST    ondansetron (ZOFRAN) 4 mg, Oral, EVERY 8 HOURS PRN    pantoprazole (PROTONIX) 40 mg, Oral, DAILY    pioglitazone (ACTOS) 15 mg, Oral, DAILY    Probiotic Product (PROBIOTIC PO) 1 capsule, Oral, DAILY    Prodigy Twist Top Lancets 28G MISC Use to test blood sugar up to 3 times per day    rosuvastatin (CRESTOR) 20 mg, Oral, DAILY    Trulicity 0.75 mg, SubCUTAneous, WEEKLY    Vitamin D3 50,000 Units, Oral, WEEKLY         Allergies:       Allergies   Allergen Reactions    Zoloft [Sertraline] Swelling         Labs:           Lab Results   Component Value Date/Time     LABA1C 8.3 08/12/2023 12:00 AM     LABA1C 7.3 05/16/2023 12:00 AM     LABA1C 6.4 12/18/2022 12:00

## 2024-06-12 ENCOUNTER — TELEPHONE (OUTPATIENT)
Dept: PHARMACY | Facility: CLINIC | Age: 44
End: 2024-06-12

## 2024-06-12 NOTE — TELEPHONE ENCOUNTER
[facility] Employee Diabetes Program - Live Well With Diabetes  =================================================================  El Santa is a 43 y.o. female enrolled in the [facility] Be Well with Diabetes program.    Identified care gap(s): A1c > 8%    DM Program Prescriptions:  Current Outpatient Medications   Medication Instructions    Actemra 162 mg, SubCUTAneous, EVERY 7 DAYS    amLODIPine (NORVASC) 10 mg, Oral, DAILY    aspirin 81 mg, Oral, DAILY    azaTHIOprine (IMURAN) 50 MG tablet Take by mouth 2 tablets (100 mg) in the morning and 1 tablet (50 mg) in the evening    carvedilol (COREG) 3.125 mg, Oral, 2 TIMES DAILY WITH MEALS    clopidogrel (PLAVIX) 75 mg, Oral, DAILY    diclofenac (VOLTAREN) 75 mg, Oral, DAILY    Eluxadoline 100 mg, Oral, 2 times daily    estradiol (ESTRACE) 1 mg, Oral, DAILY    fish oil 1,000 mg, Oral, DAILY    FLUoxetine (PROZAC) 40 mg, Oral, DAILY    Glucose Blood (PRODIGY BLOOD GLUCOSE TEST VI) Use to test blood sugar up to 3 times per day    Insulin Pen Needle 31G X 8 MM MISC Use every day with insulin as directed    Lantus SoloStar 36 Units, SubCUTAneous, EVERY MORNING    medroxyPROGESTERone (PROVERA) 5 mg, Oral, DAILY    metFORMIN (GLUCOPHAGE-XR) 1,000 mg, Oral, DAILY WITH BREAKFAST    ondansetron (ZOFRAN) 4 mg, Oral, EVERY 8 HOURS PRN    pantoprazole (PROTONIX) 40 mg, Oral, DAILY    pioglitazone (ACTOS) 15 mg, Oral, DAILY    Probiotic Product (PROBIOTIC PO) 1 capsule, Oral, DAILY    Prodigy Twist Top Lancets 28G MISC Use to test blood sugar up to 3 times per day    rosuvastatin (CRESTOR) 20 mg, Oral, DAILY    Trulicity 0.75 mg, SubCUTAneous, WEEKLY    Vitamin D3 50,000 Units, Oral, WEEKLY        Allergies:  Allergies   Allergen Reactions    Zoloft [Sertraline] Swelling        Labs:    Lab Results   Component Value Date/Time    LABA1C 8.5 03/31/2024 12:00 AM    LABA1C 8.3 08/12/2023 12:00 AM    LABA1C 7.3 05/16/2023 12:00 AM    MALBCR <20

## 2024-07-02 LAB
CHOLESTEROL, TOTAL: 205 MG/DL
CHOLESTEROL/HDL RATIO: ABNORMAL
CREATININE, URINE, EXTERNAL: 24.7
ESTIMATED AVERAGE GLUCOSE: NORMAL
HBA1C MFR BLD: 6.9 %
HDLC SERPL-MCNC: 32 MG/DL (ref 35–70)
LDL CHOLESTEROL: 115
MICROALBUMIN URINE, EXTERNAL: 3
MICROALBUMIN/CREAT UR: <30 MG/G{CREAT}
NONHDLC SERPL-MCNC: ABNORMAL MG/DL
TRIGL SERPL-MCNC: 291 MG/DL
VLDLC SERPL CALC-MCNC: ABNORMAL MG/DL

## 2024-07-25 ENCOUNTER — TELEPHONE (OUTPATIENT)
Dept: PHARMACY | Facility: CLINIC | Age: 44
End: 2024-07-25

## 2024-07-25 NOTE — TELEPHONE ENCOUNTER
[facility] Employee Diabetes Program - Live Well With Diabetes    Quarterly Check-in  - Sent the following mychart/letter to patient  - Jonna Ernandez updated  - Labs updated from CE, A1c is now <8%    Congratulations! You have completed the following requirements needed to maintain enrollment in the Live Well With Diabetes Management program for 2024:        Meet with a [facility] Clinical Pharmacist at least once yearly  Visit with your physician (first yearly visit)  Visit with your physician (Second yearly visit)  First A1C  Second A1C  Lipid panel (once yearly)  Urine albumin (once yearly)    Please review the information below for further requirements that need to be completed for the remainder of the year.    To ensure participants are taking steps to control their condition ongoing requirements need to be completed. To continue to receive the [employer] With Diabetes Program benefits, the following actions must be taken:     Requirements to be completed by Dec. 31  Medication Adherence- PDC >70%    *Documentation of any requirements completed or reviewed outside of the [facility] electronic medical record will need to be faxed or emailed (fax/email info below).    If the missing requirements(s) are not met by the date listed above, you will be disqualified from the program. If any patient is dis-enrolled from the program, then they must wait a full calendar year to re-enroll in the program.       [facility] Population Health Pharmacy   Phone: [phone] Option #3  Email: [email or web address]  Fax Number: [phone]    For Pharmacy Admin Tracking Only    Program: Currently  Time Spent (min): 10

## 2025-02-13 ENCOUNTER — TELEPHONE (OUTPATIENT)
Dept: PHARMACY | Facility: CLINIC | Age: 45
End: 2025-02-13

## 2025-02-13 NOTE — TELEPHONE ENCOUNTER
**Patient is Ensemble**  Called patient to schedule 2025 yearly pharmacist appointment to discuss medications for Diabetes Management Program.    No answer. No vm.     Juanjo Jung [facility]  Clinical Pharmacy   Department, toll free: [phone] Option #3

## 2025-02-20 NOTE — TELEPHONE ENCOUNTER
Second attempt made to contact patient to schedule 2025 yearly pharmacist appointment to discuss medications for Diabetes Management Program.    No answer. No VM.     "[employer] message sent to patient.    No further patient outreach planned at this time.    Juanjo Jung [facility]  Clinical Pharmacy   Department, toll free: [phone] Option #3     For Pharmacy Admin Tracking Only    Program: [employer]  CPA in place:  No  Gap Closed?: No   Time Spent (min): 5

## 2025-02-20 NOTE — TELEPHONE ENCOUNTER
Noted.    Krystle Armstrong [facility]   Population Health Clinical   [facility] Clinical Pharmacy  Department, toll free: [phone], option 3

## 2025-04-01 ENCOUNTER — CLINICAL DOCUMENTATION (OUTPATIENT)
Dept: PHARMACY | Facility: CLINIC | Age: 45
End: 2025-04-01

## 2025-04-01 NOTE — PROGRESS NOTES
[facility] Employee Diabetes Program - Live Well With Diabetes    Quarterly Check-in  Quarterly Reminder sent to patient for the DM Program - See Carin message or Letter for more information  Sent Carin Hale CPhT  [facility]  Clinical Pharmacy   Phone: [phone], Option #3       For Pharmacy Admin Tracking Only    Program: [employer]  CPA in place:  No  Gap Closed?: No   Time Spent (min): 5     =======================================================    Mychart/Letter for participant:      Thanks so much for taking the first step towards better health.    To ensure participants are taking steps to control their condition, ongoing requirements need to be completed. To continue to receive the Live Well With Diabetes Program benefit for 2025, and be eligible in 2026, the following actions must be taken:     Requirements to be completed by 7/1/25  Meet with a [facility] Clinical Pharmacist at least once yearly  Visit with your physician (First yearly visit)  First A1C    Requirements to be completed by 12/31/25  Visit with your physician (Second yearly visit)  Second A1C  Lipid panel (once yearly)  Urine Microalbumin (once yearly)  Taking a Statin, have a contraindication, or a Provider override  Taking an ACEi/ARB, have a contraindication, or a Provider override    Requirement due by 12/31/25 if A1C is greater than 8 percent:  Engage with a Clinical pharmacy specialists by phone at least 2 times, or complete some form of diabetes education through your provider    *Documentation of any requirements completed or reviewed outside of the [facility] electronic medical record will need to be faxed or emailed (fax/email info below).    If the missing requirements(s) are not met by the date listed above, you will be disqualified from the program and will not receive program benefits. If any patient is

## 2025-04-16 ENCOUNTER — TELEPHONE (OUTPATIENT)
Dept: PHARMACY | Facility: CLINIC | Age: 45
End: 2025-04-16

## 2025-04-16 NOTE — TELEPHONE ENCOUNTER
**2nd attempt to contact patient**    **Patient is Ensemble**    Called patient to schedule 2025 yearly pharmacist appointment to discuss medications for Diabetes Management Program.      No answer. Left VM: Please call back at [phone] Option #3.       Lesley Dela Cruz [facility]  Clinical Pharmacy   Department, toll free: [phone] Option #3

## 2025-04-25 ENCOUNTER — PATIENT MESSAGE (OUTPATIENT)
Dept: PHARMACY | Facility: CLINIC | Age: 45
End: 2025-04-25

## 2025-04-25 NOTE — TELEPHONE ENCOUNTER
Second attempt made to contact patient to schedule 2025 yearly pharmacist appointment to discuss medications for Diabetes Management Program.    No answer. Left VM.     Please call back at [phone], option #3.     [employer] message sent.        Estephania Dela Cruz [facility]  Clinical Pharmacy   Phone: [phone], option #3       For Pharmacy Admin Tracking Only    Program: [employer]  CPA in place:  No  Gap Closed?: No   Time Spent (min): 5

## 2025-05-05 NOTE — TELEPHONE ENCOUNTER
[employer] message not read. Sending letter.       Estephania Dela Cruz [facility]  Clinical Pharmacy   Phone: [phone], Option #3

## 2025-06-13 ENCOUNTER — PATIENT MESSAGE (OUTPATIENT)
Dept: PHARMACY | Facility: CLINIC | Age: 45
End: 2025-06-13

## 2025-06-13 ENCOUNTER — TELEPHONE (OUTPATIENT)
Dept: PHARMACY | Facility: CLINIC | Age: 45
End: 2025-06-13

## 2025-06-13 NOTE — TELEPHONE ENCOUNTER
[facility] Employee Diabetes Program - Live Well With Diabetes    Quarterly Check-in  Quarterly Reminder sent to patient for the DM Program - See Mychart message or Letter for more information  Called patient to discuss missing requirements  Left VM  Sent Mychart  Jonna Hale [facility]  Population Health Clinical   [facility] Clinical Pharmacy  Department, toll free: [phone], option 3      For Pharmacy Admin Tracking Only    Program: [employer]  CPA in place:  No  Gap Closed?: No   Time Spent (min): 10       =======================================================    Mychart/Letter for participant:      Thanks so much for taking the first step towards better health.    To ensure participants are taking steps to control their condition, ongoing requirements need to be completed. To continue to receive the Live Well With Diabetes Program benefit for 2025, and be eligible in 2026, the following actions must be taken:     Requirements to be completed by 7/1/25  Meet with a [facility] Clinical Pharmacist at least once yearly  Visit with your physician (First yearly visit)  First A1C    Requirements to be completed by 12/31/25  Visit with your physician (Second yearly visit)  Second A1C  Lipid panel (once yearly)  Urine Microalbumin (once yearly)  Taking a Statin, have a contraindication, or a Provider override  Taking an ACEi/ARB, have a contraindication, or a Provider override    Requirement due by 12/31/25 if A1C is greater than 8 percent:  Engage with a Clinical pharmacy specialists by phone at least 2 times, or complete some form of diabetes education through your provider    *Documentation of any requirements completed or reviewed outside of the [facility] electronic medical record will need to be faxed or emailed (fax/email info below).    If the missing requirements(s) are not met by the date listed above, you will

## 2025-06-24 NOTE — TELEPHONE ENCOUNTER
[employer] message not read. Sending letter.       Estephania Dela Cruz [facility]  Clinical Pharmacy   Phone: [phone], Option #3